# Patient Record
Sex: MALE | Race: OTHER | HISPANIC OR LATINO | URBAN - METROPOLITAN AREA
[De-identification: names, ages, dates, MRNs, and addresses within clinical notes are randomized per-mention and may not be internally consistent; named-entity substitution may affect disease eponyms.]

---

## 2020-06-22 ENCOUNTER — EMERGENCY (EMERGENCY)
Facility: HOSPITAL | Age: 25
LOS: 0 days | Discharge: HOME | End: 2020-06-22
Attending: EMERGENCY MEDICINE
Payer: MEDICAID

## 2020-06-22 VITALS
TEMPERATURE: 99 F | HEART RATE: 62 BPM | SYSTOLIC BLOOD PRESSURE: 139 MMHG | RESPIRATION RATE: 18 BRPM | DIASTOLIC BLOOD PRESSURE: 87 MMHG | OXYGEN SATURATION: 100 %

## 2020-06-22 VITALS — WEIGHT: 160.06 LBS

## 2020-06-22 DIAGNOSIS — A64 UNSPECIFIED SEXUALLY TRANSMITTED DISEASE: ICD-10-CM

## 2020-06-22 DIAGNOSIS — R21 RASH AND OTHER NONSPECIFIC SKIN ERUPTION: ICD-10-CM

## 2020-06-22 DIAGNOSIS — R10.11 RIGHT UPPER QUADRANT PAIN: ICD-10-CM

## 2020-06-22 DIAGNOSIS — N50.89 OTHER SPECIFIED DISORDERS OF THE MALE GENITAL ORGANS: ICD-10-CM

## 2020-06-22 LAB
ALBUMIN SERPL ELPH-MCNC: 4 G/DL — SIGNIFICANT CHANGE UP (ref 3.3–5)
ALP SERPL-CCNC: 107 U/L — SIGNIFICANT CHANGE UP (ref 40–120)
ALT FLD-CCNC: 92 U/L — HIGH (ref 12–78)
APPEARANCE UR: CLEAR — SIGNIFICANT CHANGE UP
AST SERPL-CCNC: 39 U/L — HIGH (ref 15–37)
BASOPHILS # BLD AUTO: 0.11 K/UL — SIGNIFICANT CHANGE UP (ref 0–0.2)
BASOPHILS NFR BLD AUTO: 1.6 % — SIGNIFICANT CHANGE UP (ref 0–2)
BILIRUB SERPL-MCNC: 0.2 MG/DL — SIGNIFICANT CHANGE UP (ref 0.2–1.2)
BILIRUB UR-MCNC: NEGATIVE — SIGNIFICANT CHANGE UP
BUN SERPL-MCNC: 8 MG/DL — SIGNIFICANT CHANGE UP (ref 7–23)
CALCIUM SERPL-MCNC: 9.2 MG/DL — SIGNIFICANT CHANGE UP (ref 8.5–10.1)
CHLORIDE SERPL-SCNC: 110 MMOL/L — HIGH (ref 96–108)
CO2 SERPL-SCNC: 31 MMOL/L — SIGNIFICANT CHANGE UP (ref 22–31)
COLOR SPEC: YELLOW — SIGNIFICANT CHANGE UP
CREAT SERPL-MCNC: 1.06 MG/DL — SIGNIFICANT CHANGE UP (ref 0.5–1.3)
DIFF PNL FLD: NEGATIVE — SIGNIFICANT CHANGE UP
EOSINOPHIL # BLD AUTO: 0.66 K/UL — HIGH (ref 0–0.5)
EOSINOPHIL NFR BLD AUTO: 9.9 % — HIGH (ref 0–6)
GLUCOSE SERPL-MCNC: 106 MG/DL — HIGH (ref 70–99)
GLUCOSE UR QL: NEGATIVE MG/DL — SIGNIFICANT CHANGE UP
HCT VFR BLD CALC: 52.9 % — HIGH (ref 39–50)
HGB BLD-MCNC: 17.5 G/DL — HIGH (ref 13–17)
IMM GRANULOCYTES NFR BLD AUTO: 0.1 % — SIGNIFICANT CHANGE UP (ref 0–1.5)
KETONES UR-MCNC: NEGATIVE — SIGNIFICANT CHANGE UP
LEUKOCYTE ESTERASE UR-ACNC: NEGATIVE — SIGNIFICANT CHANGE UP
LIDOCAIN IGE QN: 79 U/L — SIGNIFICANT CHANGE UP (ref 73–393)
LYMPHOCYTES # BLD AUTO: 3.1 K/UL — SIGNIFICANT CHANGE UP (ref 1–3.3)
LYMPHOCYTES # BLD AUTO: 46.3 % — HIGH (ref 13–44)
MCHC RBC-ENTMCNC: 29 PG — SIGNIFICANT CHANGE UP (ref 27–34)
MCHC RBC-ENTMCNC: 33.1 GM/DL — SIGNIFICANT CHANGE UP (ref 32–36)
MCV RBC AUTO: 87.7 FL — SIGNIFICANT CHANGE UP (ref 80–100)
MONOCYTES # BLD AUTO: 0.45 K/UL — SIGNIFICANT CHANGE UP (ref 0–0.9)
MONOCYTES NFR BLD AUTO: 6.7 % — SIGNIFICANT CHANGE UP (ref 2–14)
NEUTROPHILS # BLD AUTO: 2.37 K/UL — SIGNIFICANT CHANGE UP (ref 1.8–7.4)
NEUTROPHILS NFR BLD AUTO: 35.4 % — LOW (ref 43–77)
NITRITE UR-MCNC: NEGATIVE — SIGNIFICANT CHANGE UP
PH UR: 5 — SIGNIFICANT CHANGE UP (ref 5–8)
PLATELET # BLD AUTO: 207 K/UL — SIGNIFICANT CHANGE UP (ref 150–400)
POTASSIUM SERPL-MCNC: 4.1 MMOL/L — SIGNIFICANT CHANGE UP (ref 3.5–5.3)
POTASSIUM SERPL-SCNC: 4.1 MMOL/L — SIGNIFICANT CHANGE UP (ref 3.5–5.3)
PROT SERPL-MCNC: 8 GM/DL — SIGNIFICANT CHANGE UP (ref 6–8.3)
PROT UR-MCNC: 15 MG/DL
RBC # BLD: 6.03 M/UL — HIGH (ref 4.2–5.8)
RBC # FLD: 14.5 % — SIGNIFICANT CHANGE UP (ref 10.3–14.5)
SODIUM SERPL-SCNC: 141 MMOL/L — SIGNIFICANT CHANGE UP (ref 135–145)
SP GR SPEC: 1.02 — SIGNIFICANT CHANGE UP (ref 1.01–1.02)
UROBILINOGEN FLD QL: NEGATIVE MG/DL — SIGNIFICANT CHANGE UP
WBC # BLD: 6.7 K/UL — SIGNIFICANT CHANGE UP (ref 3.8–10.5)
WBC # FLD AUTO: 6.7 K/UL — SIGNIFICANT CHANGE UP (ref 3.8–10.5)

## 2020-06-22 PROCEDURE — 87086 URINE CULTURE/COLONY COUNT: CPT

## 2020-06-22 PROCEDURE — 83690 ASSAY OF LIPASE: CPT

## 2020-06-22 PROCEDURE — 96360 HYDRATION IV INFUSION INIT: CPT

## 2020-06-22 PROCEDURE — 99284 EMERGENCY DEPT VISIT MOD MDM: CPT

## 2020-06-22 PROCEDURE — 80053 COMPREHEN METABOLIC PANEL: CPT

## 2020-06-22 PROCEDURE — 36415 COLL VENOUS BLD VENIPUNCTURE: CPT

## 2020-06-22 PROCEDURE — 96361 HYDRATE IV INFUSION ADD-ON: CPT

## 2020-06-22 PROCEDURE — 87591 N.GONORRHOEAE DNA AMP PROB: CPT

## 2020-06-22 PROCEDURE — 99283 EMERGENCY DEPT VISIT LOW MDM: CPT | Mod: 25

## 2020-06-22 PROCEDURE — 87491 CHLMYD TRACH DNA AMP PROBE: CPT

## 2020-06-22 PROCEDURE — 85025 COMPLETE CBC W/AUTO DIFF WBC: CPT

## 2020-06-22 PROCEDURE — 96372 THER/PROPH/DIAG INJ SC/IM: CPT

## 2020-06-22 PROCEDURE — 81001 URINALYSIS AUTO W/SCOPE: CPT

## 2020-06-22 RX ORDER — AZITHROMYCIN 500 MG/1
1 TABLET, FILM COATED ORAL ONCE
Refills: 0 | Status: DISCONTINUED | OUTPATIENT
Start: 2020-06-22 | End: 2020-06-22

## 2020-06-22 RX ORDER — SODIUM CHLORIDE 9 MG/ML
1000 INJECTION INTRAMUSCULAR; INTRAVENOUS; SUBCUTANEOUS ONCE
Refills: 0 | Status: COMPLETED | OUTPATIENT
Start: 2020-06-22 | End: 2020-06-22

## 2020-06-22 RX ORDER — AZITHROMYCIN 500 MG/1
1000 TABLET, FILM COATED ORAL ONCE
Refills: 0 | Status: COMPLETED | OUTPATIENT
Start: 2020-06-22 | End: 2020-06-22

## 2020-06-22 RX ORDER — CEFTRIAXONE 500 MG/1
250 INJECTION, POWDER, FOR SOLUTION INTRAMUSCULAR; INTRAVENOUS ONCE
Refills: 0 | Status: COMPLETED | OUTPATIENT
Start: 2020-06-22 | End: 2020-06-22

## 2020-06-22 RX ADMIN — CEFTRIAXONE 250 MILLIGRAM(S): 500 INJECTION, POWDER, FOR SOLUTION INTRAMUSCULAR; INTRAVENOUS at 13:04

## 2020-06-22 RX ADMIN — AZITHROMYCIN 1000 MILLIGRAM(S): 500 TABLET, FILM COATED ORAL at 13:04

## 2020-06-22 RX ADMIN — SODIUM CHLORIDE 1000 MILLILITER(S): 9 INJECTION INTRAMUSCULAR; INTRAVENOUS; SUBCUTANEOUS at 13:03

## 2020-06-22 RX ADMIN — SODIUM CHLORIDE 1000 MILLILITER(S): 9 INJECTION INTRAMUSCULAR; INTRAVENOUS; SUBCUTANEOUS at 10:51

## 2020-06-22 NOTE — ED ADULT TRIAGE NOTE - CHIEF COMPLAINT QUOTE
pt c/o RLQ abdominal pain for two days with urinary retention and discharge from penis for unknown amount of time. pt denies NVD and fever. pt admits to having unprotected sex. pt denies pain with urination. pt denies fall or trauma, pt denies blood from penis or hematuria

## 2020-06-22 NOTE — ED ADULT NURSE NOTE - NSIMPLEMENTINTERV_GEN_ALL_ED
Implemented All Fall with Harm Risk Interventions:  [place] to call system. Call bell, personal items and telephone within reach. Instruct patient to call for assistance. Room bathroom lighting operational. Non-slip footwear when patient is off stretcher. Physically safe environment: no spills, clutter or unnecessary equipment. Stretcher in lowest position, wheels locked, appropriate side rails in place. Provide visual cue, wrist band, yellow gown, etc. Monitor gait and stability. Monitor for mental status changes and reorient to person, place, and time. Review medications for side effects contributing to fall risk. Reinforce activity limits and safety measures with patient and family. Provide visual clues: red socks.

## 2020-06-22 NOTE — ED PROVIDER NOTE - PATIENT PORTAL LINK FT
You can access the FollowMyHealth Patient Portal offered by [facility] by registering at the following website: [email or web address]. By joining [email or web address]’s FollowMyHealth portal, you will also be able to view your health information using other applications (apps) compatible with our system.

## 2020-06-22 NOTE — ED PROVIDER NOTE - NSFOLLOWUPINSTRUCTIONS_ED_ALL_ED_FT
No sexual intercourse until after this all clears up.  Follow up [facility] clin[facility].        ED evaluation and management discussed with the patient and family (if available) in detail.  Close PMD follow up encouraged.  Strict ED return instructions discussed in detail and patient given the opportunity to ask any questions about their discharge diagnosis and instructions. Patient verbalized understanding.      Enfermedades de transmisión sexual    LO QUE NECESITA SABER:    Ivon enfermedad de transmisión sexual (ETS) significa que se rider desarrollado signos o síntomas de ivon infección de transmisión sexual (ITS). Ivon ETS ocurre cuando ivon bacteria o un virus se propagan a través del sexo oral, genital o anal. Algunos ejemplos de estas ETS son el VIH, la clamidia, la sífilis y la gonorrea.    INSTRUCCIONES SOBRE [facility]ITALARIA:    Regrese a la wojciech de emergencias si:    Tiene inflamación o dolor en los genitales, o sangrado inusual.      Tiene dolor en las articulaciones, sarpullido, ganglios linfáticos inflamados o sudoración nocturna.      Usted tiene dolor abdominal intenso.    Llame a chou médico si:    Tiene fiebre.      Rowena síntomas no desaparecen o empeoran, incluso después del tratamiento.      Usted tiene sangrado o dolor alberto rowena relaciones sexuales.      Usted o chou maria a femenina puede estar embarazada.      Usted tiene preguntas o inquietudes acerca de chou condición o cuidado.    Medicamentos:Es posible que usted necesite alguno de los siguientes:     Los antibióticosse pueden administrar para tratar ivon infección bacteriana.      Los antiviralesse pueden administrar para tratar ivon infección viral.      Los antifúngicosse pueden administrar para ivon infección por hongos, sadie ivon infección por levaduras.      [place] rowena medicamentos sadie se le haya indicado.Consulte con chou médico si usted angélica que chou medicamento no le está ayudando o si presenta efectos secundarios. Infórmele si es alérgico a algún medicamento. Mantenga ivon lista actualizada de los medicamentos, las vitaminas y los productos herbales que darryl. Incluya los siguientes datos de los medicamentos: cantidad, frecuencia y motivo de administración. Traiga con usted la lista o los envases de las píldoras a rowena citas de seguimiento. Lleve la lista de los medicamentos con usted en jamie de ivon emergencia.    Ayude a prevenir la propagación de ivon ITS:Pídale a chou médico más información sobre las siguientes prácticas de sexo seguro:    Use un condón masculino o femenino alberto el sexo.[place] incluye sexo oral, genital o anal. Use un nuevo preservativo cada vez. Los condones ayudan a prevenir el embarazo y las ITS. Use condones de látex, si es posible. Los condones de piel de gee (también conocidos sadie piel de oveja o de membrana natural) no protegen contra las ITS. Se puede usar un condón de poliuretano si usted o chou maria a son alérgicos al látex. Los condones deben usarse sadie ivon segunda forma de control de la natalidad para prevenir el embarazo y las ITS. No use condones masculinos y femeninos juntos.      No tenga relaciones sexuales con alguien que tenga ivon ITS o ETS.[place] incluye sexo anal u oral.      Limite las parejas sexuales.Pregunte sobre el historial sexual de chou maria a antes de tener relaciones sexuales.      Hágase pruebas de detección de infecciones con regularidad si es ivon persona sexualmente activa.Las pruebas habituales incluyen clamidia, gonorrea, VIH, hepatitis y sífilis.      Informe a rowena parejas sexuales si usted tiene ivon ITS.Chou maria a podría necesitar que le phyllis pruebas y recibir tratamiento. No tenga relaciones sexuales mientras esté recibiendo tratamiento para ivon ITS. No tenga relaciones sexuales con ivon maria a que esté siendo tratada.      Pregunte acerca de los medicamentos para reducir el riesgo de algunas ITS:  Las vacunaspueden ayudar a protegerlo contra la hepatitis A, la hepatitis B y el virus del papiloma humano (VPH). La vacuna contra el VPH se suele administrar a los 11 años, lona se puede administrar a lo donita de 26 años tanto a mujeres sadie a hombres. Chou médico puede darle más información sobre las vacunas para prevenir las ITS.      La profilaxis preexposición (PPE)se puede administrar si usted tiene un riesgo alto de VIH. La profilaxis preexposición se darryl todos los días para evitar que el virus infecte completamente el cuerpo.      Si usted es brianna, no use duchas vaginales.Las duchas vaginales alteran el equilibrio normal de las bacterias que se encuentran en la vagina. Además de no prevenir o eliminar infecciones vaginales.    Acuda a la consulta de control con chou médico según las indicaciones:Es posible que necesite más exámenes. Si tiene ivon ETS, puede necesitar un tratamiento inmediato o continuo. Chou médico también puede remitirlo a un especialista que pueda proporcionarle un tratamiento específico. [place] rowena preguntas para que se acuerde de hacerlas alberto rowena visitas. No sexual intercourse until after this all clears up.  Follow up [facility] clinica.  While in ED, you received antibiotics: ceftriaxone & Zithromax.        ED evaluation and management discussed with the patient and family (if available) in detail.  Close PMD follow up encouraged.  Strict ED return instructions discussed in detail and patient given the opportunity to ask any questions about their discharge diagnosis and instructions. Patient verbalized understanding.      Enfermedades de transmisión sexual    LO QUE NECESITA SABER:    Ivon enfermedad de transmisión sexual (ETS) significa que se rider desarrollado signos o síntomas de ivon infección de transmisión sexual (ITS). Ivon ETS ocurre cuando ivon bacteria o un virus se propagan a través del sexo oral, genital o anal. Algunos ejemplos de estas ETS son el VIH, la clamidia, la sífilis y la gonorrea.    INSTRUCCIONES SOBRE [facility]PITALARIA:    Regrese a la wojciech de emergencias si:    Tiene inflamación o dolor en los genitales, o sangrado inusual.      Tiene dolor en las articulaciones, sarpullido, ganglios linfáticos inflamados o sudoración nocturna.      Usted tiene dolor abdominal intenso.    Llame a chou médico si:    Tiene fiebre.      Rowena síntomas no desaparecen o empeoran, incluso después del tratamiento.      Usted tiene sangrado o dolor alberto rowena relaciones sexuales.      Usted o chou maria a femenina puede estar embarazada.      Usted tiene preguntas o inquietudes acerca de cohu condición o cuidado.    Medicamentos:Es posible que usted necesite alguno de los siguientes:     Los antibióticosse pueden administrar para tratar ivon infección bacteriana.      Los antiviralesse pueden administrar para tratar ivon infección viral.      Los antifúngicosse pueden administrar para ivon infección por hongos, sadie ivon infección por levaduras.      [place] rowena medicamentos sadie se le haya indicado.Consulte con chou médico si usted angélica que chou medicamento no le está ayudando o si presenta efectos secundarios. Infórmele si es alérgico a algún medicamento. Mantenga ivon lista actualizada de los medicamentos, las vitaminas y los productos herbales que darryl. Incluya los siguientes datos de los medicamentos: cantidad, frecuencia y motivo de administración. Traiga con usted la lista o los envases de las píldoras a rowena citas de seguimiento. Lleve la lista de los medicamentos con usted en jamie de ivon emergencia.    Ayude a prevenir la propagación de ivon ITS:Pídale a chou médico más información sobre las siguientes prácticas de sexo seguro:    Use un condón masculino o femenino alberto el sexo.[place] incluye sexo oral, genital o anal. Use un nuevo preservativo cada vez. Los condones ayudan a prevenir el embarazo y las ITS. Use condones de látex, si es posible. Los condones de piel de gee (también conocidos sadie piel de oveja o de membrana natural) no protegen contra las ITS. Se puede usar un condón de poliuretano si usted o chou maria a son alérgicos al látex. Los condones deben usarse sadie ivon segunda forma de control de la natalidad para prevenir el embarazo y las ITS. No use condones masculinos y femeninos juntos.      No tenga relaciones sexuales con alguien que tenga ivon ITS o ETS.[place] incluye sexo anal u oral.      Limite las parejas sexuales.Pregunte sobre el historial sexual de chou maria a antes de tener relaciones sexuales.      Hágase pruebas de detección de infecciones con regularidad si es ivon persona sexualmente activa.Las pruebas habituales incluyen clamidia, gonorrea, VIH, hepatitis y sífilis.      Informe a rowena parejas sexuales si usted tiene ivon ITS.Chou maria a podría necesitar que le phyllis pruebas y recibir tratamiento. No tenga relaciones sexuales mientras esté recibiendo tratamiento para ivon ITS. No tenga relaciones sexuales con ivon maria a que esté siendo tratada.      Pregunte acerca de los medicamentos para reducir el riesgo de algunas ITS:  Las vacunaspueden ayudar a protegerlo contra la hepatitis A, la hepatitis B y el virus del papiloma humano (VPH). La vacuna contra el VPH se suele administrar a los 11 años, lona se puede administrar a lo donita de 26 años tanto a mujeres sadie a hombres. Chou médico puede darle más información sobre las vacunas para prevenir las ITS.      La profilaxis preexposición (PPE)se puede administrar si usted tiene un riesgo alto de VIH. La profilaxis preexposición se darryl todos los días para evitar que el virus infecte completamente el cuerpo.      Si usted es brianna, no use duchas vaginales.Las duchas vaginales alteran el equilibrio normal de las bacterias que se encuentran en la vagina. Además de no prevenir o eliminar infecciones vaginales.    Acuda a la consulta de control con chou médico según las indicaciones:Es posible que necesite más exámenes. Si tiene ivon ETS, puede necesitar un tratamiento inmediato o continuo. Chou médico también puede remitirlo a un especialista que pueda proporcionarle un tratamiento específico. [place] rowena preguntas para que se acuerde de hacerlas alberto rowena visitas.

## 2020-06-22 NOTE — ED PROVIDER NOTE - OBJECTIVE STATEMENT
25 y/o male with no significant PMHx presents to the ED c/o intermittent RUQ pain x2 days and genital pain and rash for several days. Pt also reports he has decreased urinary frequency and a genital rash. Pt denies flank pain, dysuria, fever, SOB, coughing, decreased appetite, and hematuria. Pt also denies COVID symptoms or exposure. Pt has no other complaints at this time.  ID: [id number].

## 2020-06-22 NOTE — ED PROVIDER NOTE - CLINICAL SUMMARY MEDICAL DECISION MAKING FREE TEXT BOX
25 y/o male  presents ambulatory to ED c/o RUQ pain and genital pain for several days. Plan - labs including urine with GC chlamydia testing, IV fluid, observe, re-assess.

## 2020-06-22 NOTE — ED PROVIDER NOTE - NSFOLLOWUPCLINICS_GEN_ALL_ED_FT
[facility]  Family Medicine  [address] [place] [address]  Phone: [phone]  Fax:   Follow Up Time:

## 2020-06-23 LAB
CULTURE RESULTS: SIGNIFICANT CHANGE UP
SPECIMEN SOURCE: SIGNIFICANT CHANGE UP

## 2020-06-24 LAB
C TRACH RRNA SPEC QL NAA+PROBE: SIGNIFICANT CHANGE UP
N GONORRHOEA RRNA SPEC QL NAA+PROBE: SIGNIFICANT CHANGE UP
SPECIMEN SOURCE: SIGNIFICANT CHANGE UP

## 2022-01-05 ENCOUNTER — OUTPATIENT (OUTPATIENT)
Dept: OUTPATIENT SERVICES | Facility: HOSPITAL | Age: 27
LOS: 1 days | End: 2022-01-05
Payer: MEDICARE

## 2022-01-05 ENCOUNTER — OUTPATIENT (OUTPATIENT)
Dept: OUTPATIENT SERVICES | Facility: HOSPITAL | Age: 27
LOS: 1 days | End: 2022-01-05
Payer: MEDICAID

## 2022-01-05 DIAGNOSIS — Z20.828 CONTACT WITH AND (SUSPECTED) EXPOSURE TO OTHER VIRAL COMMUNICABLE DISEASES: ICD-10-CM

## 2022-01-05 PROBLEM — Z78.9 OTHER SPECIFIED HEALTH STATUS: Chronic | Status: ACTIVE | Noted: 2020-06-22

## 2022-01-05 LAB — SARS-COV-2 RNA SPEC QL NAA+PROBE: DETECTED

## 2022-01-05 PROCEDURE — C9803: CPT

## 2022-01-05 PROCEDURE — U0003: CPT

## 2022-01-05 PROCEDURE — U0005: CPT

## 2022-01-06 DIAGNOSIS — Z20.828 CONTACT WITH AND (SUSPECTED) EXPOSURE TO OTHER VIRAL COMMUNICABLE DISEASES: ICD-10-CM

## 2023-05-27 ENCOUNTER — EMERGENCY (EMERGENCY)
Facility: HOSPITAL | Age: 28
LOS: 0 days | Discharge: HOME | End: 2023-05-27
Attending: EMERGENCY MEDICINE
Payer: MEDICAID

## 2023-05-27 VITALS
TEMPERATURE: 99 F | SYSTOLIC BLOOD PRESSURE: 152 MMHG | HEART RATE: 64 BPM | DIASTOLIC BLOOD PRESSURE: 93 MMHG | RESPIRATION RATE: 16 BRPM | OXYGEN SATURATION: 100 %

## 2023-05-27 VITALS — WEIGHT: 149.91 LBS

## 2023-05-27 DIAGNOSIS — R11.0 NAUSEA: ICD-10-CM

## 2023-05-27 DIAGNOSIS — R50.9 FEVER, UNSPECIFIED: ICD-10-CM

## 2023-05-27 DIAGNOSIS — M79.10 MYALGIA, UNSPECIFIED SITE: ICD-10-CM

## 2023-05-27 DIAGNOSIS — J02.9 ACUTE PHARYNGITIS, UNSPECIFIED: ICD-10-CM

## 2023-05-27 DIAGNOSIS — R07.9 CHEST PAIN, UNSPECIFIED: ICD-10-CM

## 2023-05-27 DIAGNOSIS — J06.9 ACUTE UPPER RESPIRATORY INFECTION, UNSPECIFIED: ICD-10-CM

## 2023-05-27 DIAGNOSIS — R05.9 COUGH, UNSPECIFIED: ICD-10-CM

## 2023-05-27 DIAGNOSIS — Z20.822 CONTACT WITH AND (SUSPECTED) EXPOSURE TO COVID-19: ICD-10-CM

## 2023-05-27 LAB
FLUAV AG NPH QL: SIGNIFICANT CHANGE UP
FLUBV AG NPH QL: SIGNIFICANT CHANGE UP
RSV RNA NPH QL NAA+NON-PROBE: SIGNIFICANT CHANGE UP
S PYO AG SPEC QL IA: NEGATIVE — SIGNIFICANT CHANGE UP
SARS-COV-2 RNA SPEC QL NAA+PROBE: SIGNIFICANT CHANGE UP

## 2023-05-27 PROCEDURE — 87880 STREP A ASSAY W/OPTIC: CPT

## 2023-05-27 PROCEDURE — 0241U: CPT

## 2023-05-27 PROCEDURE — 99284 EMERGENCY DEPT VISIT MOD MDM: CPT

## 2023-05-27 PROCEDURE — 99283 EMERGENCY DEPT VISIT LOW MDM: CPT

## 2023-05-27 PROCEDURE — 87081 CULTURE SCREEN ONLY: CPT

## 2023-05-27 RX ORDER — IBUPROFEN 200 MG
1 TABLET ORAL
Qty: 20 | Refills: 0
Start: 2023-05-27

## 2023-05-27 RX ORDER — IBUPROFEN 200 MG
600 TABLET ORAL ONCE
Refills: 0 | Status: COMPLETED | OUTPATIENT
Start: 2023-05-27 | End: 2023-05-27

## 2023-05-27 RX ADMIN — Medication 600 MILLIGRAM(S): at 11:47

## 2023-05-27 NOTE — ED STATDOCS - NS ED ROS FT
Constitutional: +fever +chills  Eyes: No visual changes  HEENT: +sore throat  CV: +CP  Resp: No SOB +cough  GI: No abd pain or vomiting +nausea  : No dysuria  MSK: +body aches  Skin: No rash  Neuro: No headache +dizziness

## 2023-05-27 NOTE — ED STATDOCS - OBJECTIVE STATEMENT
26 y/o male w/ no pertinent PMHx presents to the ED c/o fever, chills, dizziness, body aches, cough, and n/v x2 days. Denies CP or SOB. No meds taken PTA. No other complaints at this time. NKDA. PCP: Dr. Neri.    #[id number]

## 2023-05-27 NOTE — ED STATDOCS - PROGRESS NOTE DETAILS
patient with negative nasal swab and strep swab.  Likely a viral illness.  Reviewed symptom management and return precautions -Romero Watson PA-C

## 2023-05-27 NOTE — ED STATDOCS - PATIENT PORTAL LINK FT
You can access the FollowMyHealth Patient Portal offered by [facility] by registering at the following website: [email or web address]. By joining [employer]’s FollowMyHealth portal, you will also be able to view your health information using other applications (apps) compatible with our system.

## 2023-05-27 NOTE — ED ADULT NURSE NOTE - OBJECTIVE STATEMENT
pt c/o flu like symptoms. Fever, chills, cough , congestion, nausea and vomiting x 2 days. denies cp/sob.pt sawbbed and medicated awaitoing reaseess and dispo

## 2023-05-27 NOTE — ED ADULT TRIAGE NOTE - CHIEF COMPLAINT QUOTE
pt c/o flu like symptoms. Fever, chills, cough , congestion, nausea and vomiting x 2 days. denies cp/sob. no significant PMH.

## 2023-05-27 NOTE — ED STATDOCS - PHYSICAL EXAMINATION
Constitutional: NAD AAOx3  Eyes: PERRL, EOMI  Head: Normocephalic atraumatic  Mouth: MMM  Cardiac: regular rate   Resp: Lungs CTAB  GI: Abd s/nt/nd  Neuro: CN2-12 intact  Extremities: Intact distal pulses b/l, no calf tenderness, normal ROM b/l UE and LE   Skin: No rashes Constitutional: NAD AAOx3  Eyes: PERRL, EOMI  Head: Normocephalic atraumatic, TM's are clear   Mouth: MMM, oropharynx is clear, uvula midline, no exudates, mild erythema   Cardiac: regular rate   Resp: Lungs CTAB  GI: Abd s/nt/nd  Neuro: CN2-12 intact  Extremities: Intact distal pulses b/l, no calf tenderness, normal ROM b/l UE and LE   Skin: No rashes

## 2023-05-27 NOTE — ED STATDOCS - NSFOLLOWUPINSTRUCTIONS_ED_ALL_ED_FT
Infección de las vías respiratorias superiores en adultos  Upper Respiratory Infection, Adult  Ivon infección de las vías respiratorias superiores (IVRS) afecta la nariz, la garganta y las vías respiratorias superiores que llegan a los pulmones. El tipo más común de IVRS suele conocerse sadie el resfrío común. Las IVRS generalmente mejoran solas, sin tratamiento médico.    ¿Cuáles son las causas?  La causa de las IVRS es un germen (virus). Puede contraer estos gérmenes de las siguientes maneras:  Al aspirar las gotitas que ivon persona infectada elimina al toser o estornudar.  Al tocar algo que tiene el germen (está contaminado) y luego tocarse la boca, la nariz o los ojos.  ¿Qué incrementa el riesgo?  Es más propenso a contraer ivon IVRS si:  Es muy pequeño o de edad muy avanzada.  Tiene contacto cercano con otros, sadie en el trabajo, la escuela o un centro de atención médica.  Fuma.  Tiene ivon enfermedad cardíaca o pulmonar a donita plazo (crónica).  Tiene debilitado el sistema encargado de combatir las enfermedades (sistema inmunitario).  Tiene asma o alergias nasales.  Tiene mucho estrés.  Tiene un déficit nutricional.  ¿Cuáles son los signos o síntomas?  Secreción nasal o nariz tapada (congestión).  Tos.  Estornudos.  Dolor de garganta.  Dolor de andres.  Sensación de cansancio (fatiga).  Fiebre.  No querer comer tanto sadie lo hace habitualmente.  Dolor en la frente, detrás de los ojos y por encima de los pómulos (dolor sinusal).  Yelena musculares.  Enrojecimiento o irritación de los ojos.  Presión en los oídos o la marcelle.  ¿Cómo se trata?  Las IVRS generalmente mejoran por sí solas en un período de entre 7 y 10 días. Los medicamentos no curan las IVRS, lona el médico puede recomendarle ciertos medicamentos para ayudar a aliviar los síntomas, sadie por ejemplo:  Medicamentos para la tos de venta dominic.  Medicamentos para reducir la tos (antitusivos). La tos es un tipo de defensa contra las infecciones que ayuda a despejar la nariz, la garganta, la tráquea y los pulmones (el sistema respiratorio). [place] estos medicamentos solamente sadie se lo haya indicado el médico.  Medicamentos para bajar la fiebre.  Siga estas instrucciones en chou casa:  Actividad    Descanse todo lo que sea necesario.  Si tiene fiebre, permanezca en chou casa, sin ir al trabajo o a la escuela, hasta que ya no tenga fiebre, o hasta que el médico le indique que puede regresar al trabajo o a la escuela.  Debe permanecer en cohu casa hasta que ya no pueda propagar (contagiar) la infección.  Es posible que el médico le indique que use ivon mascarilla para tener menos riesgo de propagar la infección.  Para aliviar los síntomas    Enjuáguese la boca frecuentemente con ivon mezcla de agua con sal. Para preparar agua con sal, disuelva de ½ a 1 cucharadita (de 3 a 6 g) de sal en 1 taza (237 ml) de agua tibia.  Use un humidificador de aire frío para agregar humedad al aire. [place] puede ayudarlo a que respire mejor.  Comida y bebida    Three cups showing dark yellow, yellow, and pale yellow pee.  Ev suficiente líquido para mantener la orina de color amarillo pálido.  [place] sopas y caldos transparentes.  Instrucciones generales    A sign showing that a person should not smoke.  Use los medicamentos de venta dominic y los recetados solamente sadie se lo haya indicado el médico.  No fume ni consuma ningún producto que contenga nicotina o tabaco. Si necesita ayuda para dejar de fumar, consulte al médico.  Evite estar cerca de personas que fuman (evite el humo ambiental de tabaco).  Manténgase al día con todas las vacunas (inmunizaciones) y aplíquese la vacuna contra la gripe todos los años.  Concurra a todas las visitas de seguimiento.  Cómo evitar contagiar la infección a otros    Washing hands with soap and water.  Lávese las luna con agua y jabón alberto al menos 20 segundos. Use un desinfectante para luna si no dispone de agua y jabón.  Evite tocarse la boca, la marcelle, los ojos o la nariz.  Tosa o estornude en un pañuelo de papel o sobre chou manga o codo. No tosa o estornude al aire ni se cubra la boca o la nariz con la mano.  Comuníquese con un médico si:  Siente que empeora o que no mejora.  Tiene alguno de estos síntomas:  Fiebre o escalofríos.  Mucosidad color marrón o angela en la nariz.  Líquido amarillento o amarronado (secreción)que le sale de la nariz.  Dolor en la marcelle, especialmente al inclinarse hacia adelante.  Ganglios del carmelita inflamados.  Dolor al tragar.  Zonas evelio en la parte de atrás de la garganta.  Solicite ayuda de inmediato si:  La falta de aire empeora.  Los siguientes síntomas son muy intensos o constantes:  Dolor de andres.  Dolor de oído.  Dolor en la frente, detrás de los ojos y por encima de los pómulos (dolor sinusal).  Dolor de pecho.  Tiene ivon enfermedad pulmonar prolongada (crónica) junto con cualquiera de estos síntomas:  Emitir sonidos de silbidos agudos al respirar, más a menudo al exhalar (sibilancias).  Tos prolongada (más de 14 días).  Tos con silvia.  Cambio en la mucosidad habitual.  Tiene rigidez en el carmelita.  Tiene cambios en:  La visión.  La audición.  El razonamiento.  El estado de ánimo.  Estos síntomas pueden indicar ivon emergencia. Solicite ayuda de inmediato. Llame al 911.  No espere a arnol si los síntomas desaparecen.  No conduzca por rashmi propios medios hasta el hospital.  Resumen  Ivon infección de las vías respiratorias superiores (IVRS) es causada por un germen (virus). El tipo más común de IVRS suele conocerse sadie el resfrío común.  Ivon IVRS suele mejorar en el transcurso de 7 a 10 días.  Use los medicamentos de venta dominic y los recetados solamente sadie se lo haya indicado el médico.  Esta información no tiene sadie fin reemplazar el consejo del médico. Asegúrese de hacerle al médico cualquier pregunta que tenga.

## 2023-05-27 NOTE — ED STATDOCS - ATTENDING APP SHARED VISIT CONTRIBUTION OF CARE
I, Franci Angel MD, personally saw the patient with ACP.  I have personally performed a face to face diagnostic evaluation on this patient.  I have reviewed the ACP note and agree with the history, exam, and plan of care, except as noted.  I personally saw the patient and performed a substantive portion of the visit including all aspects of the medical decision making.

## 2024-09-07 ENCOUNTER — EMERGENCY (EMERGENCY)
Facility: HOSPITAL | Age: 29
LOS: 0 days | Discharge: HOME | End: 2024-09-07
Attending: EMERGENCY MEDICINE
Payer: SELF-PAY

## 2024-09-07 VITALS — WEIGHT: 178.35 LBS

## 2024-09-07 VITALS
OXYGEN SATURATION: 97 % | TEMPERATURE: 98 F | HEART RATE: 60 BPM | RESPIRATION RATE: 18 BRPM | DIASTOLIC BLOOD PRESSURE: 80 MMHG | SYSTOLIC BLOOD PRESSURE: 127 MMHG

## 2024-09-07 DIAGNOSIS — K08.89 OTHER SPECIFIED DISORDERS OF TEETH AND SUPPORTING STRUCTURES: ICD-10-CM

## 2024-09-07 PROCEDURE — 99212 OFFICE O/P EST SF 10 MIN: CPT

## 2024-09-07 PROCEDURE — 99283 EMERGENCY DEPT VISIT LOW MDM: CPT
